# Patient Record
Sex: MALE | Race: WHITE | Employment: FULL TIME | URBAN - METROPOLITAN AREA
[De-identification: names, ages, dates, MRNs, and addresses within clinical notes are randomized per-mention and may not be internally consistent; named-entity substitution may affect disease eponyms.]

---

## 2019-08-28 ENCOUNTER — HOSPITAL ENCOUNTER (OUTPATIENT)
Dept: CV DIAGNOSTICS | Facility: HOSPITAL | Age: 29
Discharge: HOME | End: 2019-08-28
Attending: PREVENTIVE MEDICINE

## 2019-08-28 ENCOUNTER — HOSPITAL ENCOUNTER (OUTPATIENT)
Dept: GENERAL RADIOLOGY | Facility: HOSPITAL | Age: 29
Discharge: HOME | End: 2019-08-28
Attending: PREVENTIVE MEDICINE

## 2019-08-28 ENCOUNTER — OFFICE VISIT (OUTPATIENT)
Dept: OTHER | Facility: HOSPITAL | Age: 29
End: 2019-08-28
Attending: PREVENTIVE MEDICINE

## 2019-08-28 DIAGNOSIS — Z02.1 PHYSICAL EXAM, PRE-EMPLOYMENT: Primary | ICD-10-CM

## 2019-08-28 DIAGNOSIS — Z02.1 PHYSICAL EXAM, PRE-EMPLOYMENT: ICD-10-CM

## 2019-08-28 LAB
BASOPHILS # BLD AUTO: 0.01 X10(3) UL (ref 0–0.2)
BASOPHILS NFR BLD AUTO: 0.2 %
BILIRUB UR QL STRIP.AUTO: NEGATIVE
CLARITY UR REFRACT.AUTO: CLEAR
COLOR UR AUTO: YELLOW
DEPRECATED RDW RBC AUTO: 45.1 FL (ref 35.1–46.3)
EOSINOPHIL # BLD AUTO: 0.06 X10(3) UL (ref 0–0.7)
EOSINOPHIL NFR BLD AUTO: 1.3 %
ERYTHROCYTE [DISTWIDTH] IN BLOOD BY AUTOMATED COUNT: 13.5 % (ref 11–15)
GLUCOSE UR STRIP.AUTO-MCNC: NEGATIVE MG/DL
HBV SURFACE AB SER QL: REACTIVE
HBV SURFACE AB SERPL IA-ACNC: >1000 MIU/ML
HCT VFR BLD AUTO: 44.3 % (ref 39–53)
HGB BLD-MCNC: 15.2 G/DL (ref 13–17.5)
IMM GRANULOCYTES # BLD AUTO: 0.01 X10(3) UL (ref 0–1)
IMM GRANULOCYTES NFR BLD: 0.2 %
KETONES UR STRIP.AUTO-MCNC: NEGATIVE MG/DL
LEUKOCYTE ESTERASE UR QL STRIP.AUTO: NEGATIVE
LYMPHOCYTES # BLD AUTO: 1.42 X10(3) UL (ref 1–4)
LYMPHOCYTES NFR BLD AUTO: 30.8 %
MCH RBC QN AUTO: 31 PG (ref 26–34)
MCHC RBC AUTO-ENTMCNC: 34.3 G/DL (ref 31–37)
MCV RBC AUTO: 90.4 FL (ref 80–100)
MEV IGG SER-ACNC: >300 AU/ML (ref 30–?)
MONOCYTES # BLD AUTO: 0.32 X10(3) UL (ref 0.1–1)
MONOCYTES NFR BLD AUTO: 6.9 %
MUV IGG SER IA-ACNC: 81.3 AU/ML (ref 11–?)
NEUTROPHILS # BLD AUTO: 2.79 X10 (3) UL (ref 1.5–7.7)
NEUTROPHILS # BLD AUTO: 2.79 X10(3) UL (ref 1.5–7.7)
NEUTROPHILS NFR BLD AUTO: 60.6 %
NITRITE UR QL STRIP.AUTO: NEGATIVE
PH UR STRIP.AUTO: 5 [PH] (ref 4.5–8)
PLATELET # BLD AUTO: 264 10(3)UL (ref 150–450)
PROT UR STRIP.AUTO-MCNC: NEGATIVE MG/DL
RBC # BLD AUTO: 4.9 X10(6)UL (ref 4.3–5.7)
RBC UR QL AUTO: NEGATIVE
RUBV IGG SER QL: POSITIVE
RUBV IGG SER-ACNC: 67.3 IU/ML (ref 10–?)
SP GR UR STRIP.AUTO: 1.02 (ref 1–1.03)
UROBILINOGEN UR STRIP.AUTO-MCNC: <2 MG/DL
VZV IGG SER IA-ACNC: 1951 (ref 165–?)
WBC # BLD AUTO: 4.6 X10(3) UL (ref 4–11)

## 2019-08-28 PROCEDURE — 86480 TB TEST CELL IMMUN MEASURE: CPT

## 2019-08-28 PROCEDURE — 86765 RUBEOLA ANTIBODY: CPT

## 2019-08-28 PROCEDURE — 86706 HEP B SURFACE ANTIBODY: CPT

## 2019-08-28 PROCEDURE — 85025 COMPLETE CBC W/AUTO DIFF WBC: CPT

## 2019-08-28 PROCEDURE — 81003 URINALYSIS AUTO W/O SCOPE: CPT

## 2019-08-28 PROCEDURE — 86735 MUMPS ANTIBODY: CPT

## 2019-08-28 PROCEDURE — 86787 VARICELLA-ZOSTER ANTIBODY: CPT

## 2019-08-28 PROCEDURE — 86762 RUBELLA ANTIBODY: CPT

## 2019-08-29 ENCOUNTER — OFFICE VISIT (OUTPATIENT)
Dept: OTHER | Facility: HOSPITAL | Age: 29
End: 2019-08-29
Attending: PREVENTIVE MEDICINE

## 2019-08-29 DIAGNOSIS — Z02.1 PHYSICAL EXAM, PRE-EMPLOYMENT: Primary | ICD-10-CM

## 2019-08-29 LAB
ALBUMIN SERPL-MCNC: 4 G/DL (ref 3.4–5)
ALBUMIN/GLOB SERPL: 1.2 {RATIO} (ref 1–2)
ALP LIVER SERPL-CCNC: 75 U/L (ref 45–117)
ALT SERPL-CCNC: 21 U/L (ref 16–61)
ANION GAP SERPL CALC-SCNC: 5 MMOL/L (ref 0–18)
AST SERPL-CCNC: 13 U/L (ref 15–37)
BILIRUB SERPL-MCNC: 0.6 MG/DL (ref 0.1–2)
BUN BLD-MCNC: 12 MG/DL (ref 7–18)
BUN/CREAT SERPL: 11 (ref 10–20)
CALCIUM BLD-MCNC: 8.7 MG/DL (ref 8.5–10.1)
CHLORIDE SERPL-SCNC: 106 MMOL/L (ref 98–112)
CHOLEST SMN-MCNC: 173 MG/DL (ref ?–200)
CO2 SERPL-SCNC: 29 MMOL/L (ref 21–32)
CREAT BLD-MCNC: 1.09 MG/DL (ref 0.7–1.3)
GLOBULIN PLAS-MCNC: 3.3 G/DL (ref 2.8–4.4)
GLUCOSE BLD-MCNC: 88 MG/DL (ref 70–99)
HDLC SERPL-MCNC: 51 MG/DL (ref 40–59)
LDLC SERPL CALC-MCNC: 111 MG/DL (ref ?–100)
M PROTEIN MFR SERPL ELPH: 7.3 G/DL (ref 6.4–8.2)
NONHDLC SERPL-MCNC: 122 MG/DL (ref ?–130)
OSMOLALITY SERPL CALC.SUM OF ELEC: 289 MOSM/KG (ref 275–295)
POTASSIUM SERPL-SCNC: 4.1 MMOL/L (ref 3.5–5.1)
SODIUM SERPL-SCNC: 140 MMOL/L (ref 136–145)
TRIGL SERPL-MCNC: 53 MG/DL (ref 30–149)
VLDLC SERPL CALC-MCNC: 11 MG/DL (ref 0–30)

## 2019-08-29 PROCEDURE — 80061 LIPID PANEL: CPT

## 2019-08-29 PROCEDURE — 80053 COMPREHEN METABOLIC PANEL: CPT

## 2019-09-01 ENCOUNTER — HOSPITAL (OUTPATIENT)
Dept: OTHER | Age: 29
End: 2019-09-01

## 2019-09-02 LAB
M TB TUBERC IFN-G BLD QL: NEGATIVE
M TB TUBERC IFN-G/MITOGEN IGNF BLD: 0.01 IU/ML
M TB TUBERC IFN-G/MITOGEN IGNF BLD: 0.02 IU/ML
M TB TUBERC IGNF/MITOGEN IGNF CONTROL: >10 IU/ML
MITOGEN IGNF BCKGRD COR BLD-ACNC: 0.02 IU/ML

## 2020-01-21 ENCOUNTER — APPOINTMENT (OUTPATIENT)
Dept: OTHER | Facility: HOSPITAL | Age: 30
End: 2020-01-21
Attending: PREVENTIVE MEDICINE

## 2020-02-20 ENCOUNTER — OFFICE VISIT (OUTPATIENT)
Dept: OTHER | Facility: HOSPITAL | Age: 30
End: 2020-02-20
Attending: PREVENTIVE MEDICINE

## 2020-02-20 DIAGNOSIS — Z00.00 ANNUAL PHYSICAL EXAM: Primary | ICD-10-CM

## 2020-02-20 LAB
ATRIAL RATE: 64 BPM
P AXIS: 74 DEGREES
P-R INTERVAL: 168 MS
Q-T INTERVAL: 388 MS
QRS DURATION: 90 MS
QTC CALCULATION (BEZET): 400 MS
R AXIS: 71 DEGREES
T AXIS: 50 DEGREES
VENTRICULAR RATE: 64 BPM

## 2020-12-08 ENCOUNTER — OFFICE VISIT (OUTPATIENT)
Dept: FAMILY MEDICINE CLINIC | Facility: CLINIC | Age: 30
End: 2020-12-08
Payer: COMMERCIAL

## 2020-12-08 VITALS
HEART RATE: 78 BPM | OXYGEN SATURATION: 98 % | BODY MASS INDEX: 26.88 KG/M2 | RESPIRATION RATE: 16 BRPM | HEIGHT: 71 IN | DIASTOLIC BLOOD PRESSURE: 76 MMHG | WEIGHT: 192 LBS | SYSTOLIC BLOOD PRESSURE: 118 MMHG

## 2020-12-08 DIAGNOSIS — Z00.00 WELLNESS EXAMINATION: Primary | ICD-10-CM

## 2020-12-08 DIAGNOSIS — R09.81 CHRONIC NASAL CONGESTION: ICD-10-CM

## 2020-12-08 PROCEDURE — 90686 IIV4 VACC NO PRSV 0.5 ML IM: CPT | Performed by: FAMILY MEDICINE

## 2020-12-08 PROCEDURE — 90471 IMMUNIZATION ADMIN: CPT | Performed by: FAMILY MEDICINE

## 2020-12-08 PROCEDURE — 3074F SYST BP LT 130 MM HG: CPT | Performed by: FAMILY MEDICINE

## 2020-12-08 PROCEDURE — 3008F BODY MASS INDEX DOCD: CPT | Performed by: FAMILY MEDICINE

## 2020-12-08 PROCEDURE — 99385 PREV VISIT NEW AGE 18-39: CPT | Performed by: FAMILY MEDICINE

## 2020-12-08 PROCEDURE — 3078F DIAST BP <80 MM HG: CPT | Performed by: FAMILY MEDICINE

## 2020-12-08 RX ORDER — LORATADINE 10 MG/1
10 TABLET ORAL DAILY
COMMUNITY

## 2020-12-08 NOTE — PROGRESS NOTES
HPI:   Lorena Jiménez is a 35year old male who presents for an Annual Health Visit. Wanted to get established. Has had a sinus issue for a very long time, going on 3 years. This has been a big issue for him.  Feels pressure in his sinuses at times, past medical history. History reviewed. No pertinent surgical history.    Family History   Problem Relation Age of Onset   • High Blood Pressure Father    • High Blood Pressure Mother       SOCIAL HISTORY   Social History    Tobacco Use      Smoking statu or edema  Pulses: 2+ and symmetric  Skin: Skin color, texture, turgor normal. No rashes or lesions  Lymph Nodes: Cervical, supraclavicular, and axillary nodes normal.  Neurologic: Grossly normal    ASSESSMENT AND PLAN:   Annika Abebe was seen today for physical.

## 2021-02-02 ENCOUNTER — APPOINTMENT (OUTPATIENT)
Dept: OTHER | Facility: HOSPITAL | Age: 31
End: 2021-02-02
Attending: PREVENTIVE MEDICINE

## 2021-04-14 ENCOUNTER — LAB ENCOUNTER (OUTPATIENT)
Dept: LAB | Age: 31
End: 2021-04-14
Attending: PREVENTIVE MEDICINE

## 2021-04-14 DIAGNOSIS — Z20.822 SUSPECTED COVID-19 VIRUS INFECTION: Primary | ICD-10-CM

## 2021-04-14 DIAGNOSIS — Z20.822 SUSPECTED COVID-19 VIRUS INFECTION: ICD-10-CM

## 2021-08-23 ENCOUNTER — WALK IN (OUTPATIENT)
Dept: URGENT CARE | Age: 31
End: 2021-08-23

## 2021-08-23 VITALS
BODY MASS INDEX: 27.3 KG/M2 | HEIGHT: 71 IN | WEIGHT: 195 LBS | SYSTOLIC BLOOD PRESSURE: 118 MMHG | DIASTOLIC BLOOD PRESSURE: 68 MMHG | OXYGEN SATURATION: 98 % | HEART RATE: 84 BPM | TEMPERATURE: 97.8 F | RESPIRATION RATE: 16 BRPM

## 2021-08-23 DIAGNOSIS — H10.9 BACTERIAL CONJUNCTIVITIS: ICD-10-CM

## 2021-08-23 DIAGNOSIS — J01.40 ACUTE NON-RECURRENT PANSINUSITIS: Primary | ICD-10-CM

## 2021-08-23 DIAGNOSIS — J30.9 ALLERGIC RHINITIS, UNSPECIFIED SEASONALITY, UNSPECIFIED TRIGGER: ICD-10-CM

## 2021-08-23 PROCEDURE — 99203 OFFICE O/P NEW LOW 30 MIN: CPT | Performed by: NURSE PRACTITIONER

## 2021-08-23 RX ORDER — PREDNISONE 20 MG/1
20 TABLET ORAL 2 TIMES DAILY
Qty: 10 TABLET | Refills: 0 | Status: SHIPPED | OUTPATIENT
Start: 2021-08-23 | End: 2021-08-28

## 2021-08-23 RX ORDER — MONTELUKAST SODIUM 10 MG/1
10 TABLET ORAL NIGHTLY
Qty: 30 TABLET | Refills: 0 | Status: SHIPPED | OUTPATIENT
Start: 2021-08-23 | End: 2021-09-22

## 2021-08-23 RX ORDER — AMOXICILLIN AND CLAVULANATE POTASSIUM 875; 125 MG/1; MG/1
1 TABLET, FILM COATED ORAL 2 TIMES DAILY
Qty: 20 TABLET | Refills: 0 | Status: SHIPPED | OUTPATIENT
Start: 2021-08-23 | End: 2021-09-02

## 2021-08-23 RX ORDER — MOXIFLOXACIN 5 MG/ML
1 SOLUTION/ DROPS OPHTHALMIC 3 TIMES DAILY
Qty: 1 EACH | Refills: 0 | Status: SHIPPED | OUTPATIENT
Start: 2021-08-23 | End: 2021-08-30

## 2021-08-23 RX ORDER — FLUTICASONE PROPIONATE 50 MCG
2 SPRAY, SUSPENSION (ML) NASAL DAILY
Qty: 16 G | Refills: 3 | Status: SHIPPED | OUTPATIENT
Start: 2021-08-23

## 2021-08-23 RX ORDER — ALBUTEROL SULFATE 90 UG/1
2 AEROSOL, METERED RESPIRATORY (INHALATION) EVERY 4 HOURS PRN
Qty: 1 EACH | Refills: 12 | Status: SHIPPED | OUTPATIENT
Start: 2021-08-23

## 2021-08-23 RX ORDER — BENZONATATE 100 MG/1
100 CAPSULE ORAL 3 TIMES DAILY PRN
Qty: 24 CAPSULE | Refills: 0 | Status: SHIPPED | OUTPATIENT
Start: 2021-08-23

## 2021-08-23 ASSESSMENT — ENCOUNTER SYMPTOMS
DIZZINESS: 0
SHORTNESS OF BREATH: 0
RHINORRHEA: 0
FACIAL SWELLING: 0
LIGHT-HEADEDNESS: 0
COUGH: 1
SPEECH DIFFICULTY: 0
CONSTITUTIONAL NEGATIVE: 1
SINUS PAIN: 1
CHEST TIGHTNESS: 1
HEADACHES: 1
PSYCHIATRIC NEGATIVE: 1
WHEEZING: 0
TROUBLE SWALLOWING: 0
WEAKNESS: 0
NUMBNESS: 0
FACIAL ASYMMETRY: 0
HEMATOLOGIC/LYMPHATIC NEGATIVE: 1
GASTROINTESTINAL NEGATIVE: 1
SEIZURES: 0
EYE PAIN: 0
APNEA: 0
VOICE CHANGE: 0
EYE REDNESS: 1
ENDOCRINE NEGATIVE: 1
SORE THROAT: 0
EYE ITCHING: 1
CHOKING: 0
SINUS PRESSURE: 1
STRIDOR: 0
TREMORS: 0
EYE DISCHARGE: 1
PHOTOPHOBIA: 0

## 2021-10-14 DIAGNOSIS — J01.40 ACUTE NON-RECURRENT PANSINUSITIS: ICD-10-CM

## 2021-10-14 DIAGNOSIS — J30.9 ALLERGIC RHINITIS, UNSPECIFIED SEASONALITY, UNSPECIFIED TRIGGER: ICD-10-CM

## 2021-10-15 RX ORDER — MONTELUKAST SODIUM 10 MG/1
TABLET ORAL
Qty: 30 TABLET | Refills: 0 | OUTPATIENT
Start: 2021-10-15

## 2022-03-11 ENCOUNTER — APPOINTMENT (OUTPATIENT)
Dept: OTHER | Facility: HOSPITAL | Age: 32
End: 2022-03-11
Attending: PREVENTIVE MEDICINE

## 2023-04-14 ENCOUNTER — APPOINTMENT (OUTPATIENT)
Dept: OTHER | Facility: HOSPITAL | Age: 33
End: 2023-04-14
Attending: PREVENTIVE MEDICINE

## 2024-01-06 ENCOUNTER — HOSPITAL ENCOUNTER (OUTPATIENT)
Dept: GENERAL RADIOLOGY | Facility: HOSPITAL | Age: 34
Discharge: HOME | End: 2024-01-06
Attending: FAMILY MEDICINE
Payer: OTHER MISCELLANEOUS

## 2024-01-06 DIAGNOSIS — R76.12 POSITIVE QUANTIFERON-TB GOLD TEST: ICD-10-CM

## 2024-02-20 ENCOUNTER — APPOINTMENT (OUTPATIENT)
Dept: OTHER | Facility: HOSPITAL | Age: 34
End: 2024-02-20
Attending: PREVENTIVE MEDICINE

## 2024-04-26 ENCOUNTER — OCC HEALTH (OUTPATIENT)
Dept: OTHER | Facility: HOSPITAL | Age: 34
End: 2024-04-26
Attending: PREVENTIVE MEDICINE

## 2024-04-26 ENCOUNTER — HOSPITAL ENCOUNTER (OUTPATIENT)
Dept: GENERAL RADIOLOGY | Facility: HOSPITAL | Age: 34
Discharge: HOME | End: 2024-04-26
Attending: PREVENTIVE MEDICINE

## 2024-04-26 DIAGNOSIS — S60.221A CONTUSION OF DORSUM OF RIGHT HAND: ICD-10-CM

## 2024-04-26 DIAGNOSIS — S60.221A CONTUSION OF DORSUM OF RIGHT HAND: Primary | ICD-10-CM

## 2024-04-26 DIAGNOSIS — R20.2 PARESTHESIAS IN RIGHT HAND: ICD-10-CM

## 2024-04-26 PROCEDURE — 73130 X-RAY EXAM OF HAND: CPT | Performed by: PREVENTIVE MEDICINE

## 2024-04-26 PROCEDURE — 73110 X-RAY EXAM OF WRIST: CPT | Performed by: PREVENTIVE MEDICINE

## 2024-06-22 ENCOUNTER — HOSPITAL ENCOUNTER (EMERGENCY)
Facility: HOSPITAL | Age: 34
Discharge: HOME | End: 2024-06-22
Attending: EMERGENCY MEDICINE

## 2024-06-22 ENCOUNTER — APPOINTMENT (OUTPATIENT)
Dept: GENERAL RADIOLOGY | Facility: HOSPITAL | Age: 34
End: 2024-06-22
Attending: EMERGENCY MEDICINE

## 2024-06-22 VITALS
OXYGEN SATURATION: 100 % | DIASTOLIC BLOOD PRESSURE: 79 MMHG | HEART RATE: 77 BPM | BODY MASS INDEX: 27 KG/M2 | WEIGHT: 191.81 LBS | TEMPERATURE: 98 F | RESPIRATION RATE: 15 BRPM | SYSTOLIC BLOOD PRESSURE: 127 MMHG

## 2024-06-22 DIAGNOSIS — S63.601A SPRAIN OF RIGHT THUMB, UNSPECIFIED SITE OF DIGIT, INITIAL ENCOUNTER: Primary | ICD-10-CM

## 2024-06-22 PROCEDURE — 73130 X-RAY EXAM OF HAND: CPT | Performed by: EMERGENCY MEDICINE

## 2024-06-22 PROCEDURE — 99283 EMERGENCY DEPT VISIT LOW MDM: CPT

## 2024-06-22 NOTE — ED PROVIDER NOTES
Patient Seen in: [facility] Emergency Department      History     Chief Complaint   Patient presents with    Arm or Hand Injury     Stated Complaint: hand injury from work    Subjective:   HPI    Patient is a 33-year-old male presents to ED for evaluation of the right hand pain.  Is right-handed.  Patient states that he was doing a drill exercise on Wednesday where he strongly cryptic the railing and felt a pop in his right thumb region.  Since then has had some pain with movement of his hand.  There is no swelling no paresthesias.  No direct trauma.  No redness.  No other complaints.    Objective:   History reviewed. No pertinent past medical history.           History reviewed. No pertinent surgical history.             Social History     Socioeconomic History    Marital status: Single   Tobacco Use    Smoking status: Never    Smokeless tobacco: Never   Substance and Sexual Activity    Alcohol use: Yes              Review of Systems    Positive for stated complaint: hand injury from work  Other systems are as noted in HPI.  Constitutional and vital signs reviewed.      All other systems reviewed and negative except as noted above.    Physical Exam     ED Triage Vitals [06/22/24 0803]   BP (!) 129/92   Pulse 75   Resp 14   Temp 97.9 °F (36.6 °C)   Temp src Temporal   SpO2 99 %   O2 Device None (Room air)       Current Vitals:   Vital Signs  BP: (!) 129/92  Pulse: 75  Resp: 14  Temp: 97.9 °F (36.6 °C)  Temp src: Temporal    Oxygen Therapy  SpO2: 99 %  O2 Device: None (Room air)            Physical Exam  Vitals and nursing note reviewed.   Cardiovascular:      Pulses: Normal pulses.   Musculoskeletal:         General: No deformity or signs of injury.      Comments: Some tenderness along the adductor pollicis    No scaphoid tenderness   Skin:     Capillary Refill: Capillary refill takes less than 2 seconds.   Neurological:      Sensory: No sensory deficit.      Motor: No weakness.              ED Course   Labs  Reviewed - No data to display                    MDM            -Pulse oximetry was interpreted by me and was normal.  Pulse oximeter was ordered to monitor patient for hypoxia.             -Comorbidities did add complexity to the management are mentioned in the HPI above             -DDX: Includes but not limited to fracture, sprain        -I personally reviewed the radiographs findings and they show no fx   Please refer to radiology report for official interpretation    Patient placed in a thumb spica splint.  He will be referred to employee health.  Discharged home stable condition                                     Medical Decision Making      Disposition and Plan     Clinical Impression:  1. Sprain of right thumb, unspecified site of digit, initial encounter         Disposition:  Discharge  6/22/2024  9:07 am    Follow-up:  Aubrey Rand MD  [address] [place] [id number]  [phone]    Follow up            Medications Prescribed:  Current Discharge Medication List

## 2024-06-22 NOTE — ED INITIAL ASSESSMENT (HPI)
Pt presents to ed with right hand pain after completing drill on his shift, no deformity or swelling noted

## 2024-06-24 ENCOUNTER — APPOINTMENT (OUTPATIENT)
Dept: OTHER | Facility: HOSPITAL | Age: 34
End: 2024-06-24
Attending: PREVENTIVE MEDICINE

## 2025-03-07 ENCOUNTER — APPOINTMENT (OUTPATIENT)
Dept: OTHER | Facility: HOSPITAL | Age: 35
End: 2025-03-07
Attending: NURSE PRACTITIONER